# Patient Record
Sex: FEMALE | Race: WHITE
[De-identification: names, ages, dates, MRNs, and addresses within clinical notes are randomized per-mention and may not be internally consistent; named-entity substitution may affect disease eponyms.]

---

## 2018-10-03 ENCOUNTER — HOSPITAL ENCOUNTER (OUTPATIENT)
Dept: HOSPITAL 47 - RADUSWWP | Age: 33
Discharge: HOME | End: 2018-10-03
Attending: FAMILY MEDICINE
Payer: COMMERCIAL

## 2018-10-03 DIAGNOSIS — R14.0: ICD-10-CM

## 2018-10-03 DIAGNOSIS — R10.11: Primary | ICD-10-CM

## 2018-10-03 PROCEDURE — 76705 ECHO EXAM OF ABDOMEN: CPT

## 2018-10-03 NOTE — US
EXAMINATION TYPE: US abdomen limited

 

DATE OF EXAM: 10/3/2018

 

COMPARISON: NONE

 

CLINICAL HISTORY: 33-year-old female R14.0Abdominal distension (gaseous)  R10.11. RUQ pain, feeling f
ull

 

TECHNIQUE: Multiple sonographic images of the right upper quadrant are obtained.

 

FINDINGS:

 

EXAM MEASUREMENTS:

 

Liver Length:  15.5 cm   

Gallbladder Wall:  0.2 cm   

CBD:  0.5 cm

Right Kidney:  11.0 x 4.7 x 4.8 cm

 

 

 

Pancreas:  Most of the pancreas is seen and appears within normal limits.

Liver:  wnl  

Gallbladder:  wnl

**Evidence for sonographic Lentz's sign:  No

CBD:  wnl 

Right Kidney:  No hydronephrosis.

 

 

 

IMPRESSION: 

No specific sonographic abnormality of the right upper quadrant.

## 2018-10-10 ENCOUNTER — HOSPITAL ENCOUNTER (OUTPATIENT)
Dept: HOSPITAL 47 - RADFLWHC | Age: 33
Discharge: HOME | End: 2018-10-10
Attending: FAMILY MEDICINE
Payer: COMMERCIAL

## 2018-10-10 DIAGNOSIS — K31.89: Primary | ICD-10-CM

## 2018-10-10 PROCEDURE — 74246 X-RAY XM UPR GI TRC 2CNTRST: CPT

## 2018-10-10 NOTE — FL
EXAMINATION TYPE: FL UGI air w esophagus

 

DATE OF EXAM: 10/10/2018

 

COMPARISON: Abdominal ultrasound dated 10/3/2018

 

HISTORY: Epigastric pain and early satiety for 2 weeks.

 

TECHNIQUE:  A double contrast UGI study is performed. 2 minutes and 23 seconds of fluoroscopy was uti
lized with 64 fluoroscopic images saved.

 

FINDINGS:   fluoroscopic image of the abdomen shows no gross abnormality.

 

The esophagus shows normal motility and emptying into the stomach.  No evidence of hiatal hernia or s
tricture noted.

 

The stomach shows normal distensibility and peristalsis. There are mildly thickened gastric rugal fol
ds most commonly related to gastritis. No evidence of any mass or ulcer disease.  No significant deepti
roesophageal reflux was seen during real time performance of this study.

 

The duodenal bulb, sweep, and proximal small bowel loops are unremarkable.

 

IMPRESSION: Mildly thickened gastric rugal folds most pronounced of the greater curvature most common
ly related to gastritis. No focal ulceration is seen. No hiatal hernia noted.